# Patient Record
(demographics unavailable — no encounter records)

---

## 2025-01-13 NOTE — ASSESSMENT
[FreeTextEntry1] : ECG: SR, no significant ST-T abnormalities and normal intervals, unifocal PVCs   HDL 50 LDL 81 TG 89 (12/2024)  HDL 49 LDL 68 TG 84 (12/2023)   HDL 51 LDL 66 TG 98 (3/2023)   HDL 45 LDL 67 TG 77 (12/2021)   A1C 5.7 (12/2024) A1C 5.7 (12/2023)  A1C 5.7 (3/2023)   Creat 1.55 (12/2024)  Creat 1.16 (12/2023)  Creat 1.44 (3/2023)  Creat 1.74 (12/2021)   ECHO 9/2023: 1. Normal LV size, systolic and diastolic function. EF 55-60% 2. Mild LAE 3. Calcified aortic valve without significant stenosis, mild AI  4. Mild MR  PHARM NUCLEAR STRESS 9/2023: 1. Negative for ischemia/infarct, EF 60% 2. Normal BP response  ECHO 6/2022: 1. Normal LV size, systolic and diastolic function. EF 55-60% 2. Normal RV/LA/RA  3. Mild AS, trivial AI  4. AScending aorta 3.95cm  Cardiac Cath (10/2020): 1. LM: normal 2. LAD: Mid LAD 50% stenosis, second lesion 60% stenosis (negative i/FFR) 3. LCx: ostium 40% stenosis 4. OM1: 70% stenosis (iFR negative for ischemia) 5. RCA: mild atherosclerosis 6. DIAGNOSTIC IMPRESSIONS: Non obstructive CAD ; negative IFR of the mid LAD 0.98 and negative FFR 0.84 as well. Negative iFR of the OM  Exercise Nuclear Stress Test (9/2020): 1. Small to moderate sized area of mild to moderate mid-apical anterior/anterolateral and apical wall ischemia 2. EKG positive for ischemia  TTE (9/2020): 1. Normal LV systolic function, LVEF 55-60% 2. Normal RV size and systolic function 3. Mild MR, Mild aortic valve stenosis 4. No pericardial effusion    CT coronary calcium score in LAD territory 2019: 335 (per patient), no record available at this time  Carotid Doppler (12/2020): 1. Mild atherosclerosis in the ICA's without evidence of stenosis

## 2025-01-13 NOTE — DISCUSSION/SUMMARY
[EKG obtained to assist in diagnosis and management of assessed problem(s)] : EKG obtained to assist in diagnosis and management of assessed problem(s) [FreeTextEntry1] : Patient is a 68yo M with HTN, HLD, AI/MR, BARRERA on CPAP, family history CAD, moderate CAD cath 2020 here for cardiac follow up.   VHD: -Echo 9/2023 with stable mild AI no significant AS, mild MR, continue surveillance  HLD: -Lipids with LDL mildly above goal from 12/2024, on Atorvastatin 80mg qhs  CVI: -  HAd increased edema after trip out of the country winter 2024, duplex in ED 2/2024 negative and BNP normal. Likely from CVI and travel related/diet related. Keep elevated and use compressions stockings and recommend vascular evaluation. Also on daily amlodipine now which has helped  CAD: -Moderate CAD on cath 2020 (40% LCX and 70% OM, 50% and 60% LAD lesions, med management)  -Pharm nuclear stress negative 9/2023, resting BP then was 152/86 -Goal LDL < 70, will add zetia 10mg daily -Mild dyspnea at times but unchanged since last stress test in 2023  PVCs -Noted on ECG today and in past as well, appear to be outflow track. No symptoms  1. CV stable, continue aggressive risk factor modification  2. Continue ASA/statin and med mgmt of CAD. Add zetia 10mg daily, recheck lipids later this year 3. Surveillance of mild AI/MR, reduced AoV excursion on prior echo appears stable. Mild on exam  4. Increase physical activity as tolerated  5. Recommend aggressive diet and lifestyle modifications   6. Follow up 6months, will be more active and let me know if any change in symptoms prior. If so will arrange echo/stress testing

## 2025-01-13 NOTE — PHYSICAL EXAM
[Normal S1, S2] : normal S1, S2 [No Rub] : no rub [No Gallop] : no gallop [Normal] : clear lung fields, good air entry, no respiratory distress [Soft] : abdomen soft [Non Tender] : non-tender [Normal Gait] : normal gait [Moves all extremities] : moves all extremities [Alert and Oriented] : alert and oriented [de-identified] : II/VI early systolic murmur [de-identified] : 1+ edema to lower shins bilateral, spider veins noted

## 2025-01-13 NOTE — HISTORY OF PRESENT ILLNESS
[FreeTextEntry1] : Patient is a 68yo M with HTN, HLD, AI/MR, BARRERA on CPAP, family history CAD, moderate CAD cath 2020 here for cardiac follow up.  Still walks regularly, at times noting some dyspnea and a bit less active than in past. Up stairs here felt SOB. No CP. Patient denies PND/orthopnea/edema/palpitations/syncope/claudication . 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   ROS: GI and  negative

## 2025-07-11 NOTE — ASSESSMENT
[FreeTextEntry1] : ECG: SR, no significant ST-T abnormalities and normal intervals, unifocal PVCs   HDL 49 LDL 52  TG 73 (6/2025)  HDL 50 LDL 81 TG 89 (12/2024)  HDL 49 LDL 68 TG 84 (12/2023)   HDL 51 LDL 66 TG 98 (3/2023)   HDL 45 LDL 67 TG 77 (12/2021)   A1C 6.0 (6/2025) A1C 5.7 (12/2024) A1C 5.7 (12/2023)  A1C 5.7 (3/2023)   Creat 1.40 (6.2025) Creat 1.55 (12/2024)  Creat 1.16 (12/2023)  Creat 1.44 (3/2023)  Creat 1.74 (12/2021)   ECHO 9/2023: 1. Normal LV size, systolic and diastolic function. EF 55-60% 2. Mild LAE 3. Calcified aortic valve without significant stenosis, mild AI  4. Mild MR  PHARM NUCLEAR STRESS 9/2023: 1. Negative for ischemia/infarct, EF 60% 2. Normal BP response  ECHO 6/2022: 1. Normal LV size, systolic and diastolic function. EF 55-60% 2. Normal RV/LA/RA  3. Mild AS, trivial AI  4. AScending aorta 3.95cm  Cardiac Cath (10/2020): 1. LM: normal 2. LAD: Mid LAD 50% stenosis, second lesion 60% stenosis (negative i/FFR) 3. LCx: ostium 40% stenosis 4. OM1: 70% stenosis (iFR negative for ischemia) 5. RCA: mild atherosclerosis 6. DIAGNOSTIC IMPRESSIONS: Non obstructive CAD ; negative IFR of the mid LAD 0.98 and negative FFR 0.84 as well. Negative iFR of the OM  Exercise Nuclear Stress Test (9/2020): 1. Small to moderate sized area of mild to moderate mid-apical anterior/anterolateral and apical wall ischemia 2. EKG positive for ischemia  TTE (9/2020): 1. Normal LV systolic function, LVEF 55-60% 2. Normal RV size and systolic function 3. Mild MR, Mild aortic valve stenosis 4. No pericardial effusion    CT coronary calcium score in LAD territory 2019: 335 (per patient), no record available at this time  Carotid Doppler (12/2020): Mild atherosclerosis in the ICA's without evidence of stenosis

## 2025-07-11 NOTE — HISTORY OF PRESENT ILLNESS
[FreeTextEntry1] : Patient is a 71yo M with HTN, HLD, AI/MR, BARRERA on CPAP, family history CAD, moderate CAD cath 2020 here for cardiac follow up.  Still walks regularly without exertional symptoms.  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   ROS: GI and  negative

## 2025-07-11 NOTE — HISTORY OF PRESENT ILLNESS
[FreeTextEntry1] : Patient is a 69yo M with HTN, HLD, AI/MR, BARRERA on CPAP, family history CAD, moderate CAD cath 2020 here for cardiac follow up.  Still walks regularly without exertional symptoms.  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   ROS: GI and  negative

## 2025-07-11 NOTE — DISCUSSION/SUMMARY
[EKG obtained to assist in diagnosis and management of assessed problem(s)] : EKG obtained to assist in diagnosis and management of assessed problem(s) [FreeTextEntry1] : Patient is a 69yo M with HTN, HLD, AI/MR, BARRERA on CPAP, family history CAD, moderate CAD cath 2020 here for cardiac follow up.   VHD: -Echo 9/2023 with stable mild AI no significant AS, mild MR, continue surveillance  HLD: -LDL at goal on Atorvastatin 80mg qhs and zetia 10mg   CVI: -  Had increased edema after trip out of the country winter 2024, duplex in ED 2/2024 negative and BNP normal. Likely from CVI and travel related/diet related. Keep elevated and use compressions stockings and recommend vascular evaluation. Also on daily amlodipine now which has helped  CAD: -Moderate CAD on cath 2020 (40% LCX and 70% OM, 50% and 60% LAD lesions, med management)  -Pharm nuclear stress negative 9/2023, resting BP then was 152/86 -no significant exertional symptoms since last stress test in 2023 -low threshold to repeat stress test is any symptoms given history  PVCs -Noted on ECG today and in past as well, appear to be outflow track. No symptoms  Creatinine 1.40 -agree with Farxiga and nephrology follow up   1. CV stable, continue aggressive risk factor modification  2. Continue ASA/statin and med mgmt of CAD. Goal LDL <70  3. Surveillance of mild AI/MR, reduced AoV excursion on prior echo appears stable. Mild on exam  4. Advised increase physical activity as tolerated and notify us if any exertional symptoms  5. Recommend aggressive diet and lifestyle modifications , HGa1c 6.0, will cut down carbs 6. Follow up 6months, sooner if needed

## 2025-07-11 NOTE — PHYSICAL EXAM
[Normal S1, S2] : normal S1, S2 [No Rub] : no rub [No Gallop] : no gallop [Normal] : clear lung fields, good air entry, no respiratory distress [Soft] : abdomen soft [Non Tender] : non-tender [Normal Gait] : normal gait [Moves all extremities] : moves all extremities [Alert and Oriented] : alert and oriented [de-identified] : II/VI early systolic murmur [de-identified] : 1+ edema to lower shins bilateral, spider veins noted

## 2025-07-11 NOTE — PHYSICAL EXAM
[Normal S1, S2] : normal S1, S2 [No Rub] : no rub [No Gallop] : no gallop [Normal] : clear lung fields, good air entry, no respiratory distress [Soft] : abdomen soft [Non Tender] : non-tender [Normal Gait] : normal gait [Moves all extremities] : moves all extremities [Alert and Oriented] : alert and oriented [de-identified] : II/VI early systolic murmur [de-identified] : 1+ edema to lower shins bilateral, spider veins noted